# Patient Record
Sex: MALE | Race: WHITE | NOT HISPANIC OR LATINO
[De-identification: names, ages, dates, MRNs, and addresses within clinical notes are randomized per-mention and may not be internally consistent; named-entity substitution may affect disease eponyms.]

---

## 2022-06-28 ENCOUNTER — NON-APPOINTMENT (OUTPATIENT)
Age: 22
End: 2022-06-28

## 2022-07-08 PROBLEM — Z00.00 ENCOUNTER FOR PREVENTIVE HEALTH EXAMINATION: Status: ACTIVE | Noted: 2022-07-08

## 2022-07-12 ENCOUNTER — APPOINTMENT (OUTPATIENT)
Dept: COLORECTAL SURGERY | Facility: CLINIC | Age: 22
End: 2022-07-12

## 2022-07-12 VITALS
TEMPERATURE: 97.5 F | OXYGEN SATURATION: 97 % | DIASTOLIC BLOOD PRESSURE: 80 MMHG | WEIGHT: 125.25 LBS | SYSTOLIC BLOOD PRESSURE: 127 MMHG | HEIGHT: 68 IN | HEART RATE: 97 BPM | BODY MASS INDEX: 18.98 KG/M2

## 2022-07-12 PROCEDURE — 46600 DIAGNOSTIC ANOSCOPY SPX: CPT

## 2022-07-12 PROCEDURE — 99203 OFFICE O/P NEW LOW 30 MIN: CPT | Mod: 25

## 2022-07-12 RX ORDER — CLOTRIMAZOLE AND BETAMETHASONE DIPROPIONATE 10; .5 MG/G; MG/G
1-0.05 CREAM TOPICAL TWICE DAILY
Qty: 1 | Refills: 3 | Status: ACTIVE | COMMUNITY
Start: 2022-07-12 | End: 1900-01-01

## 2022-07-12 NOTE — ASSESSMENT
[FreeTextEntry1] : No obvious etiology of the pruritus\par Will treat empirically with Lotrisone and Calmoseptine\par The patient will follow-up in 2 to 3 weeks if not improved

## 2022-07-12 NOTE — HISTORY OF PRESENT ILLNESS
[FreeTextEntry1] : The patient presents with reports of perianal itching and pain with wiping.  He states he has "always" had this and cannot remember a time when he did not have irritation.  He states that he currently has some issues with constipation but for the most part he has had normal soft easy bowel movements.  He sees blood occasionally with wiping.  He has tried a prescription antifungal in the past with little relief

## 2022-07-12 NOTE — PHYSICAL EXAM
[Excoriation] : no perianal excoriation [Fistula] : no fistulas [Wart] : no warts [Ulcer ___ cm] : no ulcers [Tight] : was tight [None] : there was no rectal mass  [Normal Breath Sounds] : Normal breath sounds [Wheezing] : no wheezing was heard [Normal Heart Sounds] : normal heart sounds [Normal Rate and Rhythm] : normal rate and rhythm [Alert] : alert [Oriented to Person] : oriented to person [Oriented to Place] : oriented to place [Oriented to Time] : oriented to time [Calm] : calm [de-identified] : soft, NT/ND [de-identified] : Perianal skin with a significant amount of hair in the region but no obvious dermal lesions.  Skin appears intact without any erythema [de-identified] : Anoscopy limited by the patient's tight sphincter that expels the anoscope but no obvious lesions, small internal hemorrhoids without proctitis [de-identified] : NAD [de-identified] : NCAT [de-identified] : supple [de-identified] : Normal ROM [de-identified] : warm

## 2023-06-09 ENCOUNTER — APPOINTMENT (OUTPATIENT)
Dept: COLORECTAL SURGERY | Facility: CLINIC | Age: 23
End: 2023-06-09

## 2023-06-13 ENCOUNTER — APPOINTMENT (OUTPATIENT)
Dept: COLORECTAL SURGERY | Facility: CLINIC | Age: 23
End: 2023-06-13
Payer: COMMERCIAL

## 2023-06-13 VITALS
HEART RATE: 94 BPM | SYSTOLIC BLOOD PRESSURE: 117 MMHG | BODY MASS INDEX: 20.61 KG/M2 | TEMPERATURE: 98.3 F | WEIGHT: 136 LBS | DIASTOLIC BLOOD PRESSURE: 75 MMHG | HEIGHT: 68 IN | OXYGEN SATURATION: 97 %

## 2023-06-13 DIAGNOSIS — L29.0 PRURITUS ANI: ICD-10-CM

## 2023-06-13 PROCEDURE — 99213 OFFICE O/P EST LOW 20 MIN: CPT

## 2023-06-13 RX ORDER — CLOTRIMAZOLE AND BETAMETHASONE DIPROPIONATE 10; .5 MG/G; MG/G
1-0.05 CREAM TOPICAL TWICE DAILY
Qty: 1 | Refills: 3 | Status: ACTIVE | COMMUNITY
Start: 2023-06-13 | End: 1900-01-01

## 2023-06-13 NOTE — CONSULT LETTER
[Dear  ___] : Dear  [unfilled], [Courtesy Letter:] : I had the pleasure of seeing your patient, [unfilled], in my office today. [Please see my note below.] : Please see my note below. [Sincerely,] : Sincerely, [FreeTextEntry3] : Que Chapa MD, FACS, FASCRS\par Colorectal Surgery\par [facility]\par Assistant Professor of Surgery [employer] [facility]\par [address] [place] [address], [address]\[place] [address]\par Tel: [phone] \par Cell: [phone] \par Email: [email or web address]\par

## 2023-06-13 NOTE — PHYSICAL EXAM
[Excoriation] : no perianal excoriation [Fistula] : no fistulas [Wart] : no warts [Ulcer ___ cm] : no ulcers [Tight] : was tight [None] : there was no rectal mass  [Normal Breath Sounds] : Normal breath sounds [Wheezing] : no wheezing was heard [Normal Heart Sounds] : normal heart sounds [Normal Rate and Rhythm] : normal rate and rhythm [Alert] : alert [Oriented to Person] : oriented to person [Oriented to Place] : oriented to place [Oriented to Time] : oriented to time [Calm] : calm [de-identified] : soft, NT/ND [de-identified] : Perianal skin no obvious dermal lesions.  Skin appears intact without any erythema [de-identified] : NAD [de-identified] : NCAT [de-identified] : Normal ROM [de-identified] : supple [de-identified] : warm [de-identified] : Flat affect

## 2023-06-13 NOTE — HISTORY OF PRESENT ILLNESS
[FreeTextEntry1] : The patient presents with persistent irritation, itching, burning, and bleeding.  He reports normal soft bowel movements.  At his last visit about a year ago he was given Lotrisone and told to use Calmoseptine.  He only used the Lotrisone as needed and states that he did not know he was supposed to be using Calmoseptine

## 2023-06-13 NOTE — ASSESSMENT
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  He was also reminded about the Calmoseptine and also told about Balneol\par He will try Lotrisone twice daily for 3 weeks straight before switching to try something else.  If all of the above fail to improve his symptoms he will follow-up

## 2024-09-24 ENCOUNTER — NON-APPOINTMENT (OUTPATIENT)
Age: 24
End: 2024-09-24

## 2024-09-25 ENCOUNTER — APPOINTMENT (OUTPATIENT)
Dept: PULMONOLOGY | Facility: CLINIC | Age: 24
End: 2024-09-25
Payer: COMMERCIAL

## 2024-09-25 VITALS
TEMPERATURE: 97.7 F | HEIGHT: 69 IN | RESPIRATION RATE: 18 BRPM | DIASTOLIC BLOOD PRESSURE: 78 MMHG | SYSTOLIC BLOOD PRESSURE: 120 MMHG | HEART RATE: 93 BPM | OXYGEN SATURATION: 99 % | WEIGHT: 124 LBS | BODY MASS INDEX: 18.37 KG/M2

## 2024-09-25 DIAGNOSIS — Z91.89 OTHER SPECIFIED PERSONAL RISK FACTORS, NOT ELSEWHERE CLASSIFIED: ICD-10-CM

## 2024-09-25 DIAGNOSIS — Z78.9 OTHER SPECIFIED HEALTH STATUS: ICD-10-CM

## 2024-09-25 DIAGNOSIS — R06.83 SNORING: ICD-10-CM

## 2024-09-25 DIAGNOSIS — Z86.59 PERSONAL HISTORY OF OTHER MENTAL AND BEHAVIORAL DISORDERS: ICD-10-CM

## 2024-09-25 PROCEDURE — 99203 OFFICE O/P NEW LOW 30 MIN: CPT

## 2024-09-25 NOTE — ASSESSMENT
[FreeTextEntry1] : 25 yo M presents for initial evaluation of sleep disordered breathing PMH: Raynaud's, anxiety  Sleep history:  Has difficulty falling asleep, "thoughts racing"  Can't sleep on his back- if sleeps on his back, wakes "jumping up" from sleep, wakes multiple times a night  +sleep paralysis in the later morning. Will sleep on his stomach and sleep quality is improved, however this hurts his back.  Sleep is nonrestorative. +snoring  Bed: 11 pm, takes 30-45 min to fall asleep, moves around in bed a lot, wakes multiple times to urinate, out of bed at 9-11 am  Often has very vivid dreams and nightmares. Unrefreshed upon awakening. "brain fogginess" and difficulty concentrating Morning headaches: yes Dry mouth/throat: occasional Weight trend: stable Denies drowsy driving, denies any accidents or near accidents.  EDS with ESS of 0- states he feels "tired all day and his eyes are heavy but can not fall asleep"  A/P: Snoring, DIS and DMS, possible underlying sleep disordered breathing Will send for an HST at the [facility] sleep disorder center Explained the rationale for diagnosis and treatment of sleep apnea including its effect on quality of life and long term cardiovascular risk.  Above discussed at length, all questions answered, he appears to understand and will call for results 1 week after completion of the study.

## 2024-09-25 NOTE — HISTORY OF PRESENT ILLNESS
[FreeTextEntry1] : 23 yo M presents for initial evaluation of sleep disordered breathing PMH: Raynaud's, anxiety  Sleep history:  Has difficulty falling asleep, "thoughts racing"  Can't sleep on his back- if sleeps on his back, wakes "jumping up" from sleep, wakes multiple times a night  +sleep paralysis in the later morning. Will sleep on his stomach and sleep quality is improved, however this hurts his back.  Sleep is nonrestorative. +snoring  Bed: 11 pm, takes 30-45 min to fall asleep, moves around in bed a lot, wakes multiple times to urinate, out of bed at 9-11 am  Often has very vivid dreams and nightmares. Unrefreshed upon awakening. "brain fogginess" and difficulty concentrating Morning headaches: yes Dry mouth/throat: occasional Weight trend: stable Denies drowsy driving, denies any accidents or near accidents.  EDS with ESS of 0- states he feels "tired all day and his eyes are heavy but can not fall asleep" Hobby: programming, works as a  at night Meds: none currently- prior Concerta, anxiolytics  EPWORTH SLEEPINESS SCALE 0 = Never would doze 1 = Slight chance of dozing 2 = Moderate chance of dozing 3 = High chance of dozing   Situation/Chance of dozing: Sitting and reading 0 Watching TV 0 Sitting inactive in a public place (eg a theatre or a meeting) 0 As a passenger in a car for an hour without a break 0 Lying down to rest in the afternoon when circumstances permit 0 Sitting and talking to someone 0 Sitting quietly after lunch without alcohol 0  In a car, while stopped for a few minutes in traffic 0 TOTAL SCORE 0  Quality Metrics: Smoking history: denies ESS: 0  Vaccines:  COVID:  Initial x2 Influenza: doesn't receive Pneumococcal: NA

## 2024-09-25 NOTE — PHYSICAL EXAM
[General Appearance - Well Developed] : well developed [General Appearance - In No Acute Distress] : no acute distress [Normal Conjunctiva] : the conjunctiva exhibited no abnormalities [Eyelids - No Xanthelasma] : the eyelids demonstrated no xanthelasmas [Normal Oropharynx] : normal oropharynx [Micrognathia] : micrognathia [Neck Appearance] : the appearance of the neck was normal [Heart Rate And Rhythm] : heart rate was normal and rhythm regular [Heart Sounds] : normal S1 and S2 [] : no respiratory distress [Respiration, Rhythm And Depth] : normal respiratory rhythm and effort [Auscultation Breath Sounds / Voice Sounds] : lungs were clear to auscultation bilaterally [Abnormal Walk] : normal gait [Motor Tone] : muscle strength and tone were normal [Nail Clubbing] : no clubbing of the fingernails [Cyanosis, Localized] : no localized cyanosis [Skin Color & Pigmentation] : normal skin color and pigmentation [Cranial Nerves] : cranial nerves 2-12 were intact [Oriented To Time, Place, And Person] : oriented to person, place, and time [Impaired Insight] : insight and judgment were intact

## 2024-09-25 NOTE — REVIEW OF SYSTEMS
[A.M. Headache] : headache present upon awakening [Difficulty Initiating Sleep] : difficulty falling asleep [Difficulty Maintaining Sleep] : difficulty maintaining sleep [Lower Extremity Discomfort] : no lower extremity discomfort [Unusual Sleep Behavior] : no unusual sleep behavior [Hypersomnolence] : not sleeping much more than usual [Negative] : Psychiatric [FreeTextEntry3] : per hpi [FreeTextEntry8] : per hpi

## 2024-10-03 ENCOUNTER — APPOINTMENT (OUTPATIENT)
Dept: SLEEP CENTER | Facility: CLINIC | Age: 24
End: 2024-10-03
Payer: COMMERCIAL

## 2024-10-03 PROCEDURE — 95800 SLP STDY UNATTENDED: CPT | Mod: 26

## 2024-10-15 ENCOUNTER — APPOINTMENT (OUTPATIENT)
Dept: PULMONOLOGY | Facility: CLINIC | Age: 24
End: 2024-10-15
Payer: COMMERCIAL

## 2024-10-15 PROCEDURE — 99441: CPT
